# Patient Record
Sex: MALE | Race: WHITE | NOT HISPANIC OR LATINO | RURAL
[De-identification: names, ages, dates, MRNs, and addresses within clinical notes are randomized per-mention and may not be internally consistent; named-entity substitution may affect disease eponyms.]

---

## 2024-05-26 ENCOUNTER — OFFICE VISIT (OUTPATIENT)
Dept: FAMILY MEDICINE | Facility: CLINIC | Age: 69
End: 2024-05-26
Payer: MEDICARE

## 2024-05-26 ENCOUNTER — APPOINTMENT (OUTPATIENT)
Dept: RADIOLOGY | Facility: CLINIC | Age: 69
End: 2024-05-26
Attending: NURSE PRACTITIONER
Payer: MEDICARE

## 2024-05-26 VITALS
HEART RATE: 86 BPM | RESPIRATION RATE: 20 BRPM | OXYGEN SATURATION: 89 % | TEMPERATURE: 98 F | DIASTOLIC BLOOD PRESSURE: 63 MMHG | HEIGHT: 73 IN | SYSTOLIC BLOOD PRESSURE: 126 MMHG | BODY MASS INDEX: 37.37 KG/M2 | WEIGHT: 282 LBS

## 2024-05-26 DIAGNOSIS — R50.9 FEVER, UNSPECIFIED FEVER CAUSE: Primary | ICD-10-CM

## 2024-05-26 DIAGNOSIS — J06.9 UPPER RESPIRATORY TRACT INFECTION, UNSPECIFIED TYPE: ICD-10-CM

## 2024-05-26 DIAGNOSIS — R06.2 WHEEZING: ICD-10-CM

## 2024-05-26 LAB
BASOPHILS # BLD AUTO: 0.08 K/UL (ref 0–0.2)
BASOPHILS NFR BLD AUTO: 0.8 % (ref 0–1)
CTP QC/QA: YES
DIFFERENTIAL METHOD BLD: ABNORMAL
EOSINOPHIL # BLD AUTO: 0.05 K/UL (ref 0–0.5)
EOSINOPHIL NFR BLD AUTO: 0.5 % (ref 1–4)
ERYTHROCYTE [DISTWIDTH] IN BLOOD BY AUTOMATED COUNT: 13.6 % (ref 11.5–14.5)
HCT VFR BLD AUTO: 43 % (ref 40–54)
HGB BLD-MCNC: 13.7 G/DL (ref 13.5–18)
IMM GRANULOCYTES # BLD AUTO: 0.06 K/UL (ref 0–0.04)
IMM GRANULOCYTES NFR BLD: 0.6 % (ref 0–0.4)
LYMPHOCYTES # BLD AUTO: 1.3 K/UL (ref 1–4.8)
LYMPHOCYTES NFR BLD AUTO: 13.3 % (ref 27–41)
MCH RBC QN AUTO: 30.6 PG (ref 27–31)
MCHC RBC AUTO-ENTMCNC: 31.9 G/DL (ref 32–36)
MCV RBC AUTO: 96 FL (ref 80–96)
MOLECULAR STREP A: NEGATIVE
MONOCYTES # BLD AUTO: 1.11 K/UL (ref 0–0.8)
MONOCYTES NFR BLD AUTO: 11.4 % (ref 2–6)
MPC BLD CALC-MCNC: 10 FL (ref 9.4–12.4)
NEUTROPHILS # BLD AUTO: 7.17 K/UL (ref 1.8–7.7)
NEUTROPHILS NFR BLD AUTO: 73.4 % (ref 53–65)
NRBC # BLD AUTO: 0 X10E3/UL
NRBC, AUTO (.00): 0 %
PLATELET # BLD AUTO: 261 K/UL (ref 150–400)
POC MOLECULAR INFLUENZA A AGN: NEGATIVE
POC MOLECULAR INFLUENZA B AGN: NEGATIVE
RBC # BLD AUTO: 4.48 M/UL (ref 4.6–6.2)
SARS-COV-2 RDRP RESP QL NAA+PROBE: NEGATIVE
WBC # BLD AUTO: 9.77 K/UL (ref 4.5–11)

## 2024-05-26 PROCEDURE — 85025 COMPLETE CBC W/AUTO DIFF WBC: CPT | Mod: ,,, | Performed by: CLINICAL MEDICAL LABORATORY

## 2024-05-26 PROCEDURE — 87502 INFLUENZA DNA AMP PROBE: CPT | Mod: RHCUB | Performed by: NURSE PRACTITIONER

## 2024-05-26 PROCEDURE — 71046 X-RAY EXAM CHEST 2 VIEWS: CPT | Mod: TC,RHCUB | Performed by: NURSE PRACTITIONER

## 2024-05-26 PROCEDURE — 87651 STREP A DNA AMP PROBE: CPT | Mod: RHCUB | Performed by: NURSE PRACTITIONER

## 2024-05-26 PROCEDURE — 71046 X-RAY EXAM CHEST 2 VIEWS: CPT | Mod: 26,,, | Performed by: RADIOLOGY

## 2024-05-26 PROCEDURE — 99204 OFFICE O/P NEW MOD 45 MIN: CPT | Mod: ,,, | Performed by: NURSE PRACTITIONER

## 2024-05-26 PROCEDURE — 87635 SARS-COV-2 COVID-19 AMP PRB: CPT | Mod: RHCUB | Performed by: NURSE PRACTITIONER

## 2024-05-26 RX ORDER — AZITHROMYCIN 250 MG/1
TABLET, FILM COATED ORAL
Qty: 6 TABLET | Refills: 0 | Status: SHIPPED | OUTPATIENT
Start: 2024-05-26 | End: 2024-05-31

## 2024-05-26 RX ORDER — ALBUTEROL SULFATE 90 UG/1
2 AEROSOL, METERED RESPIRATORY (INHALATION) EVERY 6 HOURS PRN
Qty: 18 G | Refills: 1 | Status: SHIPPED | OUTPATIENT
Start: 2024-05-26

## 2024-05-26 RX ORDER — PRAVASTATIN SODIUM 40 MG/1
40 TABLET ORAL
COMMUNITY
Start: 2024-03-22

## 2024-05-26 RX ORDER — FENOFIBRATE 145 MG/1
145 TABLET, FILM COATED ORAL
COMMUNITY
Start: 2024-03-22

## 2024-05-26 RX ORDER — BENZONATATE 100 MG/1
100 CAPSULE ORAL 3 TIMES DAILY PRN
Qty: 30 CAPSULE | Refills: 0 | Status: SHIPPED | OUTPATIENT
Start: 2024-05-26 | End: 2024-06-05

## 2024-05-26 RX ORDER — MULTIVITAMIN
1 TABLET ORAL DAILY
COMMUNITY

## 2024-05-26 NOTE — PROGRESS NOTES
[facility]  Chief Complaint      Chief Complaint   Patient presents with    Sinus Problem     Patient presents to the clinic for sinus issues     Nasal Congestion     Drainge and stuffy    Cough     Cough when mucus coming down    Fever       History of Present Illness      Ángel Encinas is a 68 y.o. male with chronic conditions of Hyperlipidemia and Sleep apnea.  He presents to the clinic for evaluation of sinus problems, nasal congestion, productive cough (yellow-tinged sputum) and fever. Onset of symptoms 4 days ago. Symptoms have not improved with OTC medications. The pt has tested negative today for covid-19, flu, and Strep. He is afebrile in clinic today.      HPI     Past Medical History:  Past Medical History:   Diagnosis Date    Hyperlipidemia     Sleep apnea, unspecified        Past Surgical History:   has no past surgical history on file.    Social History:  Social History     Tobacco Use    Smoking status: Every Day     Current packs/day: 1.50     Average packs/day: 1.5 packs/day for 54.2 years (81.4 ttl pk-yrs)     Types: Cigarettes     Start date: 3/5/1970     Passive exposure: Current    Smokeless tobacco: Never       I personally reviewed all past medical, surgical, and social.     Review of Systems   Constitutional:  Positive for fatigue and fever. Negative for unexpected weight change.   Respiratory:  Positive for cough and wheezing. Negative for shortness of breath.    Cardiovascular:  Negative for chest pain and leg swelling.   Gastrointestinal:  Negative for abdominal pain, constipation, diarrhea and nausea.   Genitourinary:  Negative for difficulty urinating and dysuria.   Musculoskeletal:  Negative for gait problem.   Skin:  Negative for color change and rash.   Neurological:  Negative for dizziness, weakness and headaches.   Psychiatric/Behavioral:  Negative for dysphoric mood. The patient is not nervous/anxious.         Medications:  Outpatient Encounter  "Medications as of 5/26/2024   Medication Sig Dispense Refill    fenofibrate (TRICOR) 145 MG tablet Take 145 mg by mouth.      multivitamin (ONE DAILY MULTIVITAMIN) per tablet Take 1 tablet by mouth once daily.      pravastatin (PRAVACHOL) 40 MG tablet Take 40 mg by mouth.      albuterol (VENTOLIN HFA) 90 mcg/actuation inhaler Inhale 2 puffs into the lungs every 6 (six) hours as needed for Wheezing. Rescue 18 g 1    azithromycin (Z-ELSIE) 250 MG tablet Take 2 tablets by mouth on day 1; Take 1 tablet by mouth on days 2-5 6 tablet 0    benzonatate (TESSALON) 100 MG capsule Take 1 capsule (100 mg total) by mouth 3 (three) times daily as needed for Cough. 30 capsule 0     No facility-administered encounter medications on file as of 5/26/2024.       Allergies:  Review of patient's allergies indicates:  No Known Allergies    Health Maintenance:    There is no immunization history on file for this patient.     Health Maintenance   Topic Date Due    Hepatitis C Screening  Never done    Lipid Panel  Never done    TETANUS VACCINE  Never done    Colorectal Cancer Screening  Never done    LDCT Lung Screen  Never done    Shingles Vaccine (1 of 2) Never done    Abdominal Aortic Aneurysm Screening  Never done        Physical Exam      Vital Signs  Temp: 97.8 °F (36.6 °C)  Temp Source: Oral  Pulse: 86  Resp: 20  SpO2: (!) 89 %  BP: 126/63  BP Location: Right arm  Patient Position: Sitting  Pain Score: 0-No pain  Height and Weight  Height: 6' 1" (185.4 cm)  Weight: 127.9 kg (282 lb)  BSA (Calculated - sq m): 2.57 sq meters  BMI (Calculated): 37.2  Weight in (lb) to have BMI = 25: 189.1]    Physical Exam  Vitals reviewed.   Constitutional:       Appearance: Normal appearance. He is obese.   HENT:      Head: Normocephalic.      Right Ear: Tympanic membrane normal.      Left Ear: Tympanic membrane normal.      Nose:      Right Turbinates: Swollen.      Left Turbinates: Swollen.      Mouth/Throat:      Mouth: Mucous membranes " "are moist.      Pharynx: Posterior oropharyngeal erythema present.   Cardiovascular:      Rate and Rhythm: Normal rate and regular rhythm.      Pulses: Normal pulses.           Radial pulses are 2+ on the right side and 2+ on the left side.      Heart sounds: Normal heart sounds.   Pulmonary:      Effort: Pulmonary effort is normal. No respiratory distress.      Breath sounds: Wheezing present.   Musculoskeletal:         General: Normal range of motion.      Cervical back: Neck supple.   Skin:     General: Skin is warm and dry.   Neurological:      Mental Status: He is alert and oriented to person, place, and time.   Psychiatric:         Mood and Affect: Mood normal.         Behavior: Behavior normal.        Laboratory:  CBC:  Recent Labs   Lab 05/26/24  1613   WBC 9.77   RBC 4.48 L   Hemoglobin 13.7   Hematocrit 43.0   Platelet Count 261   MCV 96.0   MCH 30.6   MCHC 31.9 L       LIPIDS:        TSH:        A1C:        No results found for: "GLU", "NA", "K", "CL", "CO2", "BUN", "CREATININE", "CALCIUM", "PROT", "ALBUMIN", "BILITOT", "ALKPHOS", "AST", "ALT", "ANIONGAP", "ESTGFRAFRICA", "EGFRNONAA"    Lab Results   Component Value Date    WBC 9.77 05/26/2024    RBC 4.48 (L) 05/26/2024    HGB 13.7 05/26/2024    HCT 43.0 05/26/2024    MCV 96.0 05/26/2024    RDW 13.6 05/26/2024     05/26/2024        No results found for: "CHOL", "TRIG", "HDL", "LDLCALC", "TOTALCHOLEST"    No results found for: "TSH"    No results found for: "HGBA1C", "ESTIMATEDAVG"     No components found for: "VITAMIND"    No results found for: "PSA"    Point Of Care Testing:  No results found for: "WBCUR", "NITRITE", "UROBILINOGEN", "PROTEINPOC", "PHUR", "BLOODUR", "SPECGRAV", "KETONESU", "BILIRUBINPOC", "GLUCOSEUR"    No results found for: "SGYJGTV0FP", "RAPFLUA", "RAPFLUB"      Assessment/Plan     1. Fever, unspecified fever cause  -     POCT Strep A, Molecular  -     POCT Influenza A/B Molecular  -     POCT COVID-19 Rapid Screening  -     CBC " Auto Differential; Future; Expected date: 05/26/2024    2. Wheezing  -     X-Ray Chest PA And Lateral; Future; Expected date: 05/26/2024    3. Upper respiratory tract infection, unspecified type  -     azithromycin (Z-ELSIE) 250 MG tablet; Take 2 tablets by mouth on day 1; Take 1 tablet by mouth on days 2-5  Dispense: 6 tablet; Refill: 0  -     benzonatate (TESSALON) 100 MG capsule; Take 1 capsule (100 mg total) by mouth 3 (three) times daily as needed for Cough.  Dispense: 30 capsule; Refill: 0  -     albuterol (VENTOLIN HFA) 90 mcg/actuation inhaler; Inhale 2 puffs into the lungs every 6 (six) hours as needed for Wheezing. Rescue  Dispense: 18 g; Refill: 1    Get regular and adequate amounts of sleep.  Increase oral fluid intake to keep secretions thin and easier to raise   Use a cool mist vaporizer or saline nasal spray to relieve congestion. ...  For sore throats, ice chips, sore throat spray or lozenges can help symptoms.  Use saline gargles for sore throat 2-4 times per day      Return to clinic to follow-up with PCP.    Questions answered to desired level of satisfaction    Verbalized understanding to all information and instructions provided      GUY Snow-[facility]

## 2024-05-27 ENCOUNTER — TELEPHONE (OUTPATIENT)
Dept: FAMILY MEDICINE | Facility: CLINIC | Age: 69
End: 2024-05-27
Payer: COMMERCIAL

## 2024-05-27 NOTE — TELEPHONE ENCOUNTER
Attempted to call pt about chest xray results per ROXANA Singh. Phone number is disconnected. Letter sent.

## 2024-05-30 ENCOUNTER — TELEPHONE (OUTPATIENT)
Dept: FAMILY MEDICINE | Facility: CLINIC | Age: 69
End: 2024-05-30
Payer: COMMERCIAL

## 2024-05-30 PROBLEM — J06.9 UPPER RESPIRATORY TRACT INFECTION: Status: ACTIVE | Noted: 2024-05-30

## 2024-05-30 NOTE — TELEPHONE ENCOUNTER
Patient called regarding a letter he received in the mail. Stated the letter said the clinic had been trying to reach him, but states he had no evidence of phone calls. Looked in chart and explained it was documented that the number the nurse tried to call was disconnected. Verified phone on chart with patient. Correct number on chart. Went on x-ray results with patient. Patient v/u and stated he has an appt with primary doctor on 5/31/24.